# Patient Record
Sex: FEMALE | Race: OTHER | HISPANIC OR LATINO | URBAN - METROPOLITAN AREA
[De-identification: names, ages, dates, MRNs, and addresses within clinical notes are randomized per-mention and may not be internally consistent; named-entity substitution may affect disease eponyms.]

---

## 2023-05-03 ENCOUNTER — EMERGENCY (EMERGENCY)
Facility: HOSPITAL | Age: 33
LOS: 0 days | Discharge: HOME | End: 2023-05-03
Attending: EMERGENCY MEDICINE
Payer: MEDICAID

## 2023-05-03 VITALS
TEMPERATURE: 98 F | OXYGEN SATURATION: 100 % | HEART RATE: 78 BPM | SYSTOLIC BLOOD PRESSURE: 110 MMHG | RESPIRATION RATE: 16 BRPM | DIASTOLIC BLOOD PRESSURE: 58 MMHG

## 2023-05-03 VITALS
HEIGHT: 63 IN | RESPIRATION RATE: 16 BRPM | WEIGHT: 123.46 LBS | SYSTOLIC BLOOD PRESSURE: 142 MMHG | OXYGEN SATURATION: 98 % | DIASTOLIC BLOOD PRESSURE: 92 MMHG | HEART RATE: 60 BPM | TEMPERATURE: 98 F

## 2023-05-03 DIAGNOSIS — R11.0 NAUSEA: ICD-10-CM

## 2023-05-03 DIAGNOSIS — Z3A.01 LESS THAN 8 WEEKS GESTATION OF PREGNANCY: ICD-10-CM

## 2023-05-03 DIAGNOSIS — R10.30 LOWER ABDOMINAL PAIN, UNSPECIFIED: ICD-10-CM

## 2023-05-03 DIAGNOSIS — Z97.5 PRESENCE OF (INTRAUTERINE) CONTRACEPTIVE DEVICE: ICD-10-CM

## 2023-05-03 DIAGNOSIS — O26.891 OTHER SPECIFIED PREGNANCY RELATED CONDITIONS, FIRST TRIMESTER: ICD-10-CM

## 2023-05-03 DIAGNOSIS — O26.31 RETAINED INTRAUTERINE CONTRACEPTIVE DEVICE IN PREGNANCY, FIRST TRIMESTER: ICD-10-CM

## 2023-05-03 LAB
ALBUMIN SERPL ELPH-MCNC: 4.3 G/DL — SIGNIFICANT CHANGE UP (ref 3.5–5.2)
ALP SERPL-CCNC: 69 U/L — SIGNIFICANT CHANGE UP (ref 30–115)
ALT FLD-CCNC: 14 U/L — SIGNIFICANT CHANGE UP (ref 0–41)
ANION GAP SERPL CALC-SCNC: 12 MMOL/L — SIGNIFICANT CHANGE UP (ref 7–14)
APPEARANCE UR: ABNORMAL
AST SERPL-CCNC: 16 U/L — SIGNIFICANT CHANGE UP (ref 0–41)
BACTERIA # UR AUTO: ABNORMAL
BASOPHILS # BLD AUTO: 0.04 K/UL — SIGNIFICANT CHANGE UP (ref 0–0.2)
BASOPHILS NFR BLD AUTO: 0.4 % — SIGNIFICANT CHANGE UP (ref 0–1)
BILIRUB DIRECT SERPL-MCNC: <0.2 MG/DL — SIGNIFICANT CHANGE UP (ref 0–0.3)
BILIRUB INDIRECT FLD-MCNC: SIGNIFICANT CHANGE UP MG/DL (ref 0.2–1.2)
BILIRUB SERPL-MCNC: 0.3 MG/DL — SIGNIFICANT CHANGE UP (ref 0.2–1.2)
BILIRUB UR-MCNC: NEGATIVE — SIGNIFICANT CHANGE UP
BLD GP AB SCN SERPL QL: SIGNIFICANT CHANGE UP
BUN SERPL-MCNC: 15 MG/DL — SIGNIFICANT CHANGE UP (ref 10–20)
CALCIUM SERPL-MCNC: 9.6 MG/DL — SIGNIFICANT CHANGE UP (ref 8.4–10.5)
CHLORIDE SERPL-SCNC: 104 MMOL/L — SIGNIFICANT CHANGE UP (ref 98–110)
CO2 SERPL-SCNC: 19 MMOL/L — SIGNIFICANT CHANGE UP (ref 17–32)
COLOR SPEC: YELLOW — SIGNIFICANT CHANGE UP
CREAT SERPL-MCNC: 0.6 MG/DL — LOW (ref 0.7–1.5)
DIFF PNL FLD: NEGATIVE — SIGNIFICANT CHANGE UP
EGFR: 122 ML/MIN/1.73M2 — SIGNIFICANT CHANGE UP
EOSINOPHIL # BLD AUTO: 0.19 K/UL — SIGNIFICANT CHANGE UP (ref 0–0.7)
EOSINOPHIL NFR BLD AUTO: 2.1 % — SIGNIFICANT CHANGE UP (ref 0–8)
EPI CELLS # UR: 21 /HPF — HIGH (ref 0–5)
GLUCOSE SERPL-MCNC: 84 MG/DL — SIGNIFICANT CHANGE UP (ref 70–99)
GLUCOSE UR QL: NEGATIVE — SIGNIFICANT CHANGE UP
HCG SERPL-ACNC: HIGH MIU/ML
HCT VFR BLD CALC: 37.9 % — SIGNIFICANT CHANGE UP (ref 37–47)
HGB BLD-MCNC: 12.9 G/DL — SIGNIFICANT CHANGE UP (ref 12–16)
HYALINE CASTS # UR AUTO: 1 /LPF — SIGNIFICANT CHANGE UP (ref 0–7)
IMM GRANULOCYTES NFR BLD AUTO: 0.3 % — SIGNIFICANT CHANGE UP (ref 0.1–0.3)
KETONES UR-MCNC: NEGATIVE — SIGNIFICANT CHANGE UP
LEUKOCYTE ESTERASE UR-ACNC: NEGATIVE — SIGNIFICANT CHANGE UP
LIDOCAIN IGE QN: 33 U/L — SIGNIFICANT CHANGE UP (ref 7–60)
LYMPHOCYTES # BLD AUTO: 2.46 K/UL — SIGNIFICANT CHANGE UP (ref 1.2–3.4)
LYMPHOCYTES # BLD AUTO: 27.5 % — SIGNIFICANT CHANGE UP (ref 20.5–51.1)
MCHC RBC-ENTMCNC: 29.5 PG — SIGNIFICANT CHANGE UP (ref 27–31)
MCHC RBC-ENTMCNC: 34 G/DL — SIGNIFICANT CHANGE UP (ref 32–37)
MCV RBC AUTO: 86.7 FL — SIGNIFICANT CHANGE UP (ref 81–99)
MONOCYTES # BLD AUTO: 0.64 K/UL — HIGH (ref 0.1–0.6)
MONOCYTES NFR BLD AUTO: 7.2 % — SIGNIFICANT CHANGE UP (ref 1.7–9.3)
NEUTROPHILS # BLD AUTO: 5.57 K/UL — SIGNIFICANT CHANGE UP (ref 1.4–6.5)
NEUTROPHILS NFR BLD AUTO: 62.5 % — SIGNIFICANT CHANGE UP (ref 42.2–75.2)
NITRITE UR-MCNC: NEGATIVE — SIGNIFICANT CHANGE UP
NRBC # BLD: 0 /100 WBCS — SIGNIFICANT CHANGE UP (ref 0–0)
PH UR: 7 — SIGNIFICANT CHANGE UP (ref 5–8)
PLATELET # BLD AUTO: 273 K/UL — SIGNIFICANT CHANGE UP (ref 130–400)
PMV BLD: 10.9 FL — HIGH (ref 7.4–10.4)
POTASSIUM SERPL-MCNC: 4.5 MMOL/L — SIGNIFICANT CHANGE UP (ref 3.5–5)
POTASSIUM SERPL-SCNC: 4.5 MMOL/L — SIGNIFICANT CHANGE UP (ref 3.5–5)
PROT SERPL-MCNC: 7.1 G/DL — SIGNIFICANT CHANGE UP (ref 6–8)
PROT UR-MCNC: ABNORMAL
RBC # BLD: 4.37 M/UL — SIGNIFICANT CHANGE UP (ref 4.2–5.4)
RBC # FLD: 12.4 % — SIGNIFICANT CHANGE UP (ref 11.5–14.5)
RBC CASTS # UR COMP ASSIST: 2 /HPF — SIGNIFICANT CHANGE UP (ref 0–4)
SODIUM SERPL-SCNC: 135 MMOL/L — SIGNIFICANT CHANGE UP (ref 135–146)
SP GR SPEC: 1.03 — SIGNIFICANT CHANGE UP (ref 1.01–1.03)
UROBILINOGEN FLD QL: SIGNIFICANT CHANGE UP
WBC # BLD: 8.93 K/UL — SIGNIFICANT CHANGE UP (ref 4.8–10.8)
WBC # FLD AUTO: 8.93 K/UL — SIGNIFICANT CHANGE UP (ref 4.8–10.8)
WBC UR QL: 5 /HPF — SIGNIFICANT CHANGE UP (ref 0–5)

## 2023-05-03 PROCEDURE — 76856 US EXAM PELVIC COMPLETE: CPT | Mod: 26

## 2023-05-03 PROCEDURE — 76856 US EXAM PELVIC COMPLETE: CPT

## 2023-05-03 PROCEDURE — 80048 BASIC METABOLIC PNL TOTAL CA: CPT

## 2023-05-03 PROCEDURE — 84702 CHORIONIC GONADOTROPIN TEST: CPT

## 2023-05-03 PROCEDURE — 99282 EMERGENCY DEPT VISIT SF MDM: CPT

## 2023-05-03 PROCEDURE — 99284 EMERGENCY DEPT VISIT MOD MDM: CPT | Mod: 25

## 2023-05-03 PROCEDURE — 81001 URINALYSIS AUTO W/SCOPE: CPT

## 2023-05-03 PROCEDURE — 96374 THER/PROPH/DIAG INJ IV PUSH: CPT

## 2023-05-03 PROCEDURE — 83690 ASSAY OF LIPASE: CPT

## 2023-05-03 PROCEDURE — 86901 BLOOD TYPING SEROLOGIC RH(D): CPT

## 2023-05-03 PROCEDURE — 86900 BLOOD TYPING SEROLOGIC ABO: CPT

## 2023-05-03 PROCEDURE — 36415 COLL VENOUS BLD VENIPUNCTURE: CPT

## 2023-05-03 PROCEDURE — 85025 COMPLETE CBC W/AUTO DIFF WBC: CPT

## 2023-05-03 PROCEDURE — 86850 RBC ANTIBODY SCREEN: CPT

## 2023-05-03 PROCEDURE — 80076 HEPATIC FUNCTION PANEL: CPT

## 2023-05-03 PROCEDURE — 99285 EMERGENCY DEPT VISIT HI MDM: CPT

## 2023-05-03 RX ORDER — SODIUM CHLORIDE 9 MG/ML
1000 INJECTION INTRAMUSCULAR; INTRAVENOUS; SUBCUTANEOUS ONCE
Refills: 0 | Status: COMPLETED | OUTPATIENT
Start: 2023-05-03 | End: 2023-05-03

## 2023-05-03 RX ORDER — ONDANSETRON 8 MG/1
4 TABLET, FILM COATED ORAL ONCE
Refills: 0 | Status: COMPLETED | OUTPATIENT
Start: 2023-05-03 | End: 2023-05-03

## 2023-05-03 RX ADMIN — ONDANSETRON 4 MILLIGRAM(S): 8 TABLET, FILM COATED ORAL at 12:34

## 2023-05-03 RX ADMIN — SODIUM CHLORIDE 1000 MILLILITER(S): 9 INJECTION INTRAMUSCULAR; INTRAVENOUS; SUBCUTANEOUS at 12:34

## 2023-05-03 NOTE — ED PROVIDER NOTE - CLINICAL SUMMARY MEDICAL DECISION MAKING FREE TEXT BOX
34 yo  @6w5d, Rh pos, with IUP and Paragard IUD in situ, no IUD strings visualized, clinically and hemodynamically stable.  OBGyn consulted.  IUD unable to be removed.  No acute GYN intervention required.  Pt to f/u for pregnancy care with [facility], please give patient clinic information with discharge. Pt should follow up in 1-2 weeks.  Strict return instructions discussed.

## 2023-05-03 NOTE — ED PROVIDER NOTE - NS ED ROS FT
Constitutional: (-) fever  Eyes/ENT: (-) blurry vision, (-) epistaxis  Cardiovascular: (-) chest pain, (-) syncope  Respiratory: (-) cough, (-) shortness of breath  Gastrointestinal: (-) vomiting, (-) diarrhea, (+)abd pain  Musculoskeletal: (-) neck pain, (-) back pain, (-) joint pain  Integumentary: (-) rash, (-) edema  Neurological: (-) headache, (-) altered mental status

## 2023-05-03 NOTE — ED PROVIDER NOTE - PATIENT PORTAL LINK FT
Intact
You can access the FollowMyHealth Patient Portal offered by [facility] by registering at the following website: [email or web address]. By joining [employer]’s FollowMyHealth portal, you will also be able to view your health information using other applications (apps) compatible with our system.

## 2023-05-03 NOTE — ED ADULT TRIAGE NOTE - TEMPERATURE IN FAHRENHEIT (DEGREES F)
98 Xelrosyz Pregnancy And Lactation Text: This medication is Pregnancy Category D and is not considered safe during pregnancy.  The risk during breast feeding is also uncertain.

## 2023-05-03 NOTE — ED PROVIDER NOTE - CARE PLAN
1 Principal Discharge DX:	Abdominal pain in pregnancy  Secondary Diagnosis:	Pregnancy occurring while using intrauterine contraceptive device (IUD)

## 2023-05-03 NOTE — ED PROVIDER NOTE - OBJECTIVE STATEMENT
33-year-old female no past medical history, last menstrual period ,  2 para 1 presents to the ED complaining of lower abdominal pain with nausea and home pregnancy test which was +2 days ago.  Patient has IUD that was placed 4 years ago.  No vaginal bleeding, vomiting, fever, chills or urinary symptoms.

## 2023-05-03 NOTE — CONSULT NOTE ADULT - ASSESSMENT
A/P: 34 yo  @6w5d, Rh pos, with IUP and Paragard IUD in situ, no IUD strings visualized, clinically and hemodynamically stable  -IUD unable to be removed  -no acute GYN intervention required  -Pt to f/u for pregnancy care with [facility], please give patient clinic information with discharge. Pt should follow up in 1-2 weeks.  -Encourage small meals, elvis, B6 for nausea in pregnancy  -bleeding precautions  -dispo per ED    Dr. Roblero and Dr. Wilson aware

## 2023-05-03 NOTE — CONSULT NOTE ADULT - SUBJECTIVE AND OBJECTIVE BOX
PGY1 Note  Chief Complaint: abdominal cramping    HPI: 32 yo  @6w5d by sonogram today, LMP 23, with Paragard IUD in situ, presenting to ED for abdominal cramping and positive home pregnancy test. Pt reports lower abdominal cramping for a few days with mild nausea. Denies fever, chills, vomiting, severe abdominal pain. This is an unplanned but desired pregnancy. Pt has Paragard IUD in place for 4 years. No current GYN provider, previously received care in home country [facility].     Denies the following: constitutional symptoms, visual symptoms, cardiovascular symptoms, respiratory symptoms, GI symptoms, musculoskeletal symptoms, skin symptoms, neurologic symptoms, hematologic symptoms, allergic symptoms, psychiatric symptoms  Except any pertinent positives listed.     Ob/Gyn History:                   LMP - 23     LTCS x1 in [facility] for "I never had contractions.", TOP x1, medical  Reports remote h/o of chlamydia, treated       Denies history of ovarian cysts, uterine fibroids, abnormal paps    Allergies  No Known Allergies    PAST MEDICAL & SURGICAL HISTORY:   section    FAMILY HISTORY: no pertinent family history    SOCIAL HISTORY: Denies cigarette use, alcohol use, or illicit drug use    Vital Signs Last 24 Hrs  T(F): 98.1 (03 May 2023 15:53), Max: 98.1 (03 May 2023 15:53)  HR: 78 (03 May 2023 15:53) (60 - 78)  BP: 110/58 (03 May 2023 15:53) (110/58 - 142/92)  RR: 16 (03 May 2023 15:53) (16 - 16)  Height (cm): 160 (23 @ 11:05)  Weight (kg): 56 (23 @ 11:05)  BMI (kg/m2): 21.9 (23 @ 11:05)  BSA (m2): 1.58 (23 @ 11:05)      General Appearance - AAOx3, NAD  Abdomen - Soft, nontender, nondistended, no rebound, no rigidity, no guarding, bowel sounds present    GYN/Pelvis:  External exam: normal female  Internal: normal vagina and cervix. Cervix closed, no bleeding, no CMT, IUD strings not visualized.   Uterus: 6-8 weeks, non-tender  Adnexa: non palpable, non-tender.     Meds:   ondansetron Injectable 4 milliGRAM(s) IV Push once  sodium chloride 0.9% Bolus 1000 milliLiter(s) IV Bolus once    Height (cm): 160 (23 @ 11:05)  Weight (kg): 56 (23 @ 11:05)  BMI (kg/m2): 21.9 (23 @ 11:05)  BSA (m2): 1.58 (23 @ 11:05)    LABS:                        12.9   8.93  )-----------( 273      ( 03 May 2023 12:20 )             37.9     HCG Quantitative, Serum: 70854.0 mIU/mL (23 @ 12:58)    ABO RH Interpretation: A POS (23 @ 12:20)  Antibody Screen: NEG (23 @ 12:20)        135  |  104  |  15  ----------------------------<  84  4.5   |  19  |  0.6<L>    Ca    9.6      03 May 2023 12:20    TPro  7.1  /  Alb  4.3  /  TBili  0.3  /  DBili  <0.2  /  AST  16  /  ALT  14  /  AlkPhos  69        Urinalysis Basic - ( 03 May 2023 12:20 )    Color: Yellow / Appearance: Slightly Turbid / S.030 / pH: x  Gluc: x / Ketone: Negative  / Bili: Negative / Urobili: <2 mg/dL   Blood: x / Protein: 30 mg/dL / Nitrite: Negative   Leuk Esterase: Negative / RBC: 2 /HPF / WBC 5 /HPF   Sq Epi: x / Non Sq Epi: x / Bacteria: Moderate      RADIOLOGY & ADDITIONAL STUDIES  from: US Pelvis Complete (23 @ 13:54)    INTERPRETATION:  CLINICAL INFORMATION: Abdominal pain, IUD    LMP: 2023    COMPARISON: None available.    TECHNIQUE:  Transabdominal pelvic sonogram only.    FINDINGS:  Uterus: 11.1 x 5.1 x 6.6 cm.. Although an IUD is present, there is a   gestational sac with fetal pole, crown-rump length measures 0.8 cm   corresponding to gestational age of 6 weeks 5 days with fetal cardiac   activity at 161 bpm      Right ovary: 3.6 x 2.2 x 2.0 cm. There is a right ovarian cyst measuring   2.4 cm. Vascular flow is identified to the right ovary.  Left ovary: 2.6 x 1.2 x 2.2 cm. Within normal limits. Vascular flow is   identified to the left ovary.    Fluid: None.    IMPRESSION:    Live intrauterine pregnancy with a crown-rump length measuring 0.8 cm   corresponding to gestational age of 6 weeks 5 days with fetal cardiac   activity at 161 bpm    Additional IUD is present    PROCEDURE  R/B/A to removing IUD discussed with patient. Patient elected to proceed with IUD removal. Consent signed with . Pt placed in dorsal lithotomy in stirrups and speculum placed in vagina. Cervix well visualized. IUD strings not seen. Procedure terminated.

## 2023-05-03 NOTE — ED PROVIDER NOTE - NSFOLLOWUPINSTRUCTIONS_ED_ALL_ED_FT
Our Emergency Department Referral Coordinators will be reaching out to you in the next 24-48 hours from 9:00am to 5:00pm with a follow up appointment. Please expect a phone call from the hospital in that time frame. If you do not receive a call or if you have any questions or concerns, you can reach them at   [phone]    Abdominal Pain During Pregnancy    Abdominal pain is common in pregnancy. Most of the time, it does not cause harm. There are many causes of abdominal pain. Some causes are more serious than others. Some of the causes of abdominal pain in pregnancy are easily diagnosed. Occasionally, the diagnosis takes time to understand. Other times, the cause is not determined. Abdominal pain can be a sign that something is very wrong with the pregnancy, or the pain may have nothing to do with the pregnancy at all. For this reason, always tell your health care provider if you have any abdominal discomfort.     HOME CARE INSTRUCTIONS  Monitor your abdominal pain for any changes. The following actions may help to alleviate any discomfort you are experiencing:    Do not have sexual intercourse or put anything in your vagina until your symptoms go away completely.  Get plenty of rest until your pain improves.   Drink clear fluids if you feel nauseous. Avoid solid food as long as you are uncomfortable or nauseous.  Only take over-the-counter or prescription medicine as directed by your health care provider.  Keep all follow-up appointments with your health care provider.    SEEK IMMEDIATE MEDICAL CARE IF:  You are bleeding, leaking fluid, or passing tissue from the vagina.  You have increasing pain or cramping.  You have persistent vomiting.  You have painful or bloody urination.  You have a fever.  You notice a decrease in your baby's movements.  You have extreme weakness or feel faint.  You have shortness of breath, with or without abdominal pain.  You develop a severe headache with abdominal pain.  You have abnormal vaginal discharge with abdominal pain.  You have persistent diarrhea.  You have abdominal pain that continues even after rest, or gets worse.    MAKE SURE YOU:  Understand these instructions.   Will watch your condition.  Will get help right away if you are not doing well or get worse.    ADDITIONAL NOTES AND INSTRUCTIONS    Please follow up with your Primary MD in 24-48 hr.  Seek immediate medical care for any new/worsening signs or symptoms.

## 2023-05-03 NOTE — ED PROVIDER NOTE - NSFOLLOWUPCLINICS_GEN_ALL_ED_FT
[facility]  OB/GYN  [address] [place] [address]  Phone: [phone]  Fax:

## 2023-05-03 NOTE — ED PROVIDER NOTE - NS ED ATTENDING STATEMENT MOD
This was a shared visit with the TOBI. I reviewed and verified the documentation and independently performed the documented:

## 2023-05-05 PROBLEM — Z78.9 OTHER SPECIFIED HEALTH STATUS: Chronic | Status: ACTIVE | Noted: 2023-05-03

## 2023-05-07 PROBLEM — Z00.00 ENCOUNTER FOR PREVENTIVE HEALTH EXAMINATION: Status: ACTIVE | Noted: 2023-05-07

## 2023-05-08 ENCOUNTER — APPOINTMENT (OUTPATIENT)
Dept: OBGYN | Facility: CLINIC | Age: 33
End: 2023-05-08
Payer: COMMERCIAL

## 2023-05-08 ENCOUNTER — OUTPATIENT (OUTPATIENT)
Dept: OUTPATIENT SERVICES | Facility: HOSPITAL | Age: 33
LOS: 1 days | End: 2023-05-08
Payer: COMMERCIAL

## 2023-05-08 VITALS
DIASTOLIC BLOOD PRESSURE: 62 MMHG | BODY MASS INDEX: 25.57 KG/M2 | WEIGHT: 135.44 LBS | HEIGHT: 61 IN | SYSTOLIC BLOOD PRESSURE: 108 MMHG

## 2023-05-08 DIAGNOSIS — Z30.432 ENCOUNTER FOR REMOVAL OF INTRAUTERINE CONTRACEPTIVE DEVICE: ICD-10-CM

## 2023-05-08 PROCEDURE — 76815 OB US LIMITED FETUS(S): CPT | Mod: 26

## 2023-05-08 PROCEDURE — 99205 OFFICE O/P NEW HI 60 MIN: CPT

## 2023-05-08 PROCEDURE — T1013: CPT

## 2023-05-08 PROCEDURE — 87661 TRICHOMONAS VAGINALIS AMPLIF: CPT

## 2023-05-08 PROCEDURE — 88142 CYTOPATH C/V THIN LAYER: CPT

## 2023-05-08 PROCEDURE — 87491 CHLMYD TRACH DNA AMP PROBE: CPT

## 2023-05-08 PROCEDURE — 87798 DETECT AGENT NOS DNA AMP: CPT

## 2023-05-08 PROCEDURE — 76815 OB US LIMITED FETUS(S): CPT

## 2023-05-08 PROCEDURE — 87801 DETECT AGNT MULT DNA AMPLI: CPT

## 2023-05-08 PROCEDURE — 87591 N.GONORRHOEAE DNA AMP PROB: CPT

## 2023-05-08 PROCEDURE — 87624 HPV HI-RISK TYP POOLED RSLT: CPT

## 2023-05-08 NOTE — PLAN
[FreeTextEntry1] : 33 p1 previous c/s\par Pregnancy with IUD.\par Mild bleeding.\par +FH on sono. The IUD was visualized and is not near the gestational sac.\par Patient would like to keep the pregnancy. \par Patient was counselled that she is at increased risk of miscarriage/bleeding, infection/chorio,  birth, abruption. She understands. \par f/u 4 weeks as new OB\par Repeat sono in 2 weeks.\par Prenatal labs ordered\par start prenatal vitamins.\par

## 2023-05-10 DIAGNOSIS — Z34.90 ENCOUNTER FOR SUPERVISION OF NORMAL PREGNANCY, UNSPECIFIED, UNSPECIFIED TRIMESTER: ICD-10-CM

## 2023-05-10 DIAGNOSIS — O20.0 THREATENED ABORTION: ICD-10-CM

## 2023-05-10 DIAGNOSIS — O26.31 RETAINED INTRAUTERINE CONTRACEPTIVE DEVICE IN PREGNANCY, FIRST TRIMESTER: ICD-10-CM

## 2023-05-14 LAB — HPV HIGH+LOW RISK DNA PNL CVX: NOT DETECTED

## 2023-05-19 ENCOUNTER — OUTPATIENT (OUTPATIENT)
Dept: OUTPATIENT SERVICES | Facility: HOSPITAL | Age: 33
LOS: 1 days | End: 2023-05-19
Payer: COMMERCIAL

## 2023-05-19 ENCOUNTER — APPOINTMENT (OUTPATIENT)
Dept: ANTEPARTUM | Facility: CLINIC | Age: 33
End: 2023-05-19
Payer: MEDICAID

## 2023-05-19 ENCOUNTER — ASOB RESULT (OUTPATIENT)
Age: 33
End: 2023-05-19

## 2023-05-19 DIAGNOSIS — Z34.90 ENCOUNTER FOR SUPERVISION OF NORMAL PREGNANCY, UNSPECIFIED, UNSPECIFIED TRIMESTER: ICD-10-CM

## 2023-05-19 LAB
A VAGINAE DNA VAG QL NAA+PROBE: NORMAL
ABO + RH PNL BLD: NORMAL
BLD GP AB SCN SERPL QL: NORMAL
BVAB2 DNA VAG QL NAA+PROBE: NORMAL
C KRUSEI DNA VAG QL NAA+PROBE: NEGATIVE
C TRACH RRNA SPEC QL NAA+PROBE: NEGATIVE
CANDIDA DNA VAG QL NAA+PROBE: NEGATIVE
CYTOLOGY CVX/VAG DOC THIN PREP: ABNORMAL
ESTIMATED AVERAGE GLUCOSE: 114 MG/DL
HBA1C MFR BLD HPLC: 5.6 %
MEGA1 DNA VAG QL NAA+PROBE: NORMAL
N GONORRHOEA RRNA SPEC QL NAA+PROBE: NEGATIVE
T VAGINALIS RRNA SPEC QL NAA+PROBE: NEGATIVE

## 2023-05-19 PROCEDURE — 83655 ASSAY OF LEAD: CPT

## 2023-05-19 PROCEDURE — G0452: CPT | Mod: 26

## 2023-05-19 PROCEDURE — 87389 HIV-1 AG W/HIV-1&-2 AB AG IA: CPT

## 2023-05-19 PROCEDURE — 86762 RUBELLA ANTIBODY: CPT

## 2023-05-19 PROCEDURE — 86787 VARICELLA-ZOSTER ANTIBODY: CPT

## 2023-05-19 PROCEDURE — 87086 URINE CULTURE/COLONY COUNT: CPT

## 2023-05-19 PROCEDURE — 83020 HEMOGLOBIN ELECTROPHORESIS: CPT

## 2023-05-19 PROCEDURE — 76801 OB US < 14 WKS SINGLE FETUS: CPT | Mod: 26

## 2023-05-19 PROCEDURE — 86900 BLOOD TYPING SEROLOGIC ABO: CPT

## 2023-05-19 PROCEDURE — 83020 HEMOGLOBIN ELECTROPHORESIS: CPT | Mod: 26

## 2023-05-19 PROCEDURE — 86480 TB TEST CELL IMMUN MEASURE: CPT

## 2023-05-19 PROCEDURE — 76801 OB US < 14 WKS SINGLE FETUS: CPT

## 2023-05-19 PROCEDURE — 81204 AR GENE CHARAC ALLELES: CPT

## 2023-05-19 PROCEDURE — 86850 RBC ANTIBODY SCREEN: CPT

## 2023-05-19 PROCEDURE — 87340 HEPATITIS B SURFACE AG IA: CPT

## 2023-05-19 PROCEDURE — 85027 COMPLETE CBC AUTOMATED: CPT

## 2023-05-19 PROCEDURE — 86803 HEPATITIS C AB TEST: CPT

## 2023-05-19 PROCEDURE — 83036 HEMOGLOBIN GLYCOSYLATED A1C: CPT

## 2023-05-19 PROCEDURE — 86780 TREPONEMA PALLIDUM: CPT

## 2023-05-19 PROCEDURE — 86901 BLOOD TYPING SEROLOGIC RH(D): CPT

## 2023-05-19 PROCEDURE — 81220 CFTR GENE COM VARIANTS: CPT

## 2023-05-21 LAB
BACTERIA UR CULT: NORMAL
HBV SURFACE AG SER QL: NONREACTIVE
HCV AB SER QL: NONREACTIVE
HCV S/CO RATIO: 0.09 S/CO
HIV1+2 AB SPEC QL IA.RAPID: NONREACTIVE
LEAD BLD-MCNC: <1 UG/DL
RUBV IGG FLD-ACNC: 3 INDEX
RUBV IGG SER-IMP: POSITIVE
T PALLIDUM AB SER QL IA: NEGATIVE
VZV AB TITR SER: POSITIVE
VZV IGG SER IF-ACNC: 205.7 INDEX

## 2023-05-22 LAB
HGB A MFR BLD: 97.6 %
HGB A2 MFR BLD: 2.4 %
HGB FRACT BLD-IMP: NORMAL

## 2023-05-24 DIAGNOSIS — O36.80X0 PREGNANCY WITH INCONCLUSIVE FETAL VIABILITY, NOT APPLICABLE OR UNSPECIFIED: ICD-10-CM

## 2023-05-24 DIAGNOSIS — O26.31 RETAINED INTRAUTERINE CONTRACEPTIVE DEVICE IN PREGNANCY, FIRST TRIMESTER: ICD-10-CM

## 2023-05-24 DIAGNOSIS — T83.31XA BREAKDOWN (MECHANICAL) OF INTRAUTERINE CONTRACEPTIVE DEVICE, INITIAL ENCOUNTER: ICD-10-CM

## 2023-05-24 DIAGNOSIS — Z3A.09 9 WEEKS GESTATION OF PREGNANCY: ICD-10-CM

## 2023-05-30 ENCOUNTER — EMERGENCY (EMERGENCY)
Facility: HOSPITAL | Age: 33
LOS: 0 days | Discharge: HOME | End: 2023-05-30
Attending: EMERGENCY MEDICINE
Payer: MEDICAID

## 2023-05-30 VITALS
RESPIRATION RATE: 16 BRPM | HEIGHT: 63 IN | WEIGHT: 130.07 LBS | SYSTOLIC BLOOD PRESSURE: 120 MMHG | HEART RATE: 93 BPM | OXYGEN SATURATION: 97 % | DIASTOLIC BLOOD PRESSURE: 71 MMHG | TEMPERATURE: 98 F

## 2023-05-30 DIAGNOSIS — R11.0 NAUSEA: ICD-10-CM

## 2023-05-30 DIAGNOSIS — R10.30 LOWER ABDOMINAL PAIN, UNSPECIFIED: ICD-10-CM

## 2023-05-30 DIAGNOSIS — Z3A.10 10 WEEKS GESTATION OF PREGNANCY: ICD-10-CM

## 2023-05-30 DIAGNOSIS — O20.9 HEMORRHAGE IN EARLY PREGNANCY, UNSPECIFIED: ICD-10-CM

## 2023-05-30 LAB
ALBUMIN SERPL ELPH-MCNC: 3.9 G/DL — SIGNIFICANT CHANGE UP (ref 3.5–5.2)
ALP SERPL-CCNC: 85 U/L — SIGNIFICANT CHANGE UP (ref 30–115)
ALT FLD-CCNC: 13 U/L — SIGNIFICANT CHANGE UP (ref 0–41)
ANION GAP SERPL CALC-SCNC: 11 MMOL/L — SIGNIFICANT CHANGE UP (ref 7–14)
AR GENE MUT ANL BLD/T: NORMAL
AST SERPL-CCNC: 15 U/L — SIGNIFICANT CHANGE UP (ref 0–41)
BASOPHILS # BLD AUTO: 0.03 K/UL — SIGNIFICANT CHANGE UP (ref 0–0.2)
BASOPHILS NFR BLD AUTO: 0.3 % — SIGNIFICANT CHANGE UP (ref 0–1)
BILIRUB SERPL-MCNC: 0.3 MG/DL — SIGNIFICANT CHANGE UP (ref 0.2–1.2)
BUN SERPL-MCNC: 11 MG/DL — SIGNIFICANT CHANGE UP (ref 10–20)
CALCIUM SERPL-MCNC: 9.5 MG/DL — SIGNIFICANT CHANGE UP (ref 8.4–10.5)
CFTR MUT TESTED BLD/T: NEGATIVE
CHLORIDE SERPL-SCNC: 102 MMOL/L — SIGNIFICANT CHANGE UP (ref 98–110)
CO2 SERPL-SCNC: 22 MMOL/L — SIGNIFICANT CHANGE UP (ref 17–32)
CREAT SERPL-MCNC: 0.6 MG/DL — LOW (ref 0.7–1.5)
EGFR: 122 ML/MIN/1.73M2 — SIGNIFICANT CHANGE UP
EOSINOPHIL # BLD AUTO: 0.23 K/UL — SIGNIFICANT CHANGE UP (ref 0–0.7)
EOSINOPHIL NFR BLD AUTO: 2 % — SIGNIFICANT CHANGE UP (ref 0–8)
FMR1 GENE MUT ANL BLD/T: NORMAL
GLUCOSE SERPL-MCNC: 92 MG/DL — SIGNIFICANT CHANGE UP (ref 70–99)
HCG SERPL-ACNC: HIGH MIU/ML
HCT VFR BLD CALC: 32.1 % — LOW (ref 37–47)
HGB BLD-MCNC: 11.4 G/DL — LOW (ref 12–16)
IMM GRANULOCYTES NFR BLD AUTO: 0.8 % — HIGH (ref 0.1–0.3)
LYMPHOCYTES # BLD AUTO: 1.86 K/UL — SIGNIFICANT CHANGE UP (ref 1.2–3.4)
LYMPHOCYTES # BLD AUTO: 16.5 % — LOW (ref 20.5–51.1)
M TB IFN-G BLD-IMP: NEGATIVE
MCHC RBC-ENTMCNC: 30.2 PG — SIGNIFICANT CHANGE UP (ref 27–31)
MCHC RBC-ENTMCNC: 35.5 G/DL — SIGNIFICANT CHANGE UP (ref 32–37)
MCV RBC AUTO: 85.1 FL — SIGNIFICANT CHANGE UP (ref 81–99)
MONOCYTES # BLD AUTO: 0.55 K/UL — SIGNIFICANT CHANGE UP (ref 0.1–0.6)
MONOCYTES NFR BLD AUTO: 4.9 % — SIGNIFICANT CHANGE UP (ref 1.7–9.3)
NEUTROPHILS # BLD AUTO: 8.49 K/UL — HIGH (ref 1.4–6.5)
NEUTROPHILS NFR BLD AUTO: 75.5 % — HIGH (ref 42.2–75.2)
NRBC # BLD: 0 /100 WBCS — SIGNIFICANT CHANGE UP (ref 0–0)
PLATELET # BLD AUTO: 290 K/UL — SIGNIFICANT CHANGE UP (ref 130–400)
PMV BLD: 10.2 FL — SIGNIFICANT CHANGE UP (ref 7.4–10.4)
POTASSIUM SERPL-MCNC: 4.3 MMOL/L — SIGNIFICANT CHANGE UP (ref 3.5–5)
POTASSIUM SERPL-SCNC: 4.3 MMOL/L — SIGNIFICANT CHANGE UP (ref 3.5–5)
PROT SERPL-MCNC: 7 G/DL — SIGNIFICANT CHANGE UP (ref 6–8)
QUANTIFERON TB PLUS MITOGEN MINUS NIL: 9.13 IU/ML
QUANTIFERON TB PLUS NIL: 0.03 IU/ML
QUANTIFERON TB PLUS TB1 MINUS NIL: -0.01 IU/ML
QUANTIFERON TB PLUS TB2 MINUS NIL: -0.01 IU/ML
RBC # BLD: 3.77 M/UL — LOW (ref 4.2–5.4)
RBC # FLD: 12.9 % — SIGNIFICANT CHANGE UP (ref 11.5–14.5)
SODIUM SERPL-SCNC: 135 MMOL/L — SIGNIFICANT CHANGE UP (ref 135–146)
WBC # BLD: 11.25 K/UL — HIGH (ref 4.8–10.8)
WBC # FLD AUTO: 11.25 K/UL — HIGH (ref 4.8–10.8)

## 2023-05-30 PROCEDURE — 99284 EMERGENCY DEPT VISIT MOD MDM: CPT | Mod: 25

## 2023-05-30 PROCEDURE — 84702 CHORIONIC GONADOTROPIN TEST: CPT

## 2023-05-30 PROCEDURE — 99282 EMERGENCY DEPT VISIT SF MDM: CPT

## 2023-05-30 PROCEDURE — 76815 OB US LIMITED FETUS(S): CPT | Mod: 26

## 2023-05-30 PROCEDURE — 99285 EMERGENCY DEPT VISIT HI MDM: CPT

## 2023-05-30 PROCEDURE — 80053 COMPREHEN METABOLIC PANEL: CPT

## 2023-05-30 PROCEDURE — 85025 COMPLETE CBC W/AUTO DIFF WBC: CPT

## 2023-05-30 PROCEDURE — 36415 COLL VENOUS BLD VENIPUNCTURE: CPT

## 2023-05-30 PROCEDURE — 76815 OB US LIMITED FETUS(S): CPT

## 2023-05-30 RX ORDER — SODIUM CHLORIDE 9 MG/ML
1000 INJECTION INTRAMUSCULAR; INTRAVENOUS; SUBCUTANEOUS ONCE
Refills: 0 | Status: COMPLETED | OUTPATIENT
Start: 2023-05-30 | End: 2023-05-30

## 2023-05-30 RX ADMIN — SODIUM CHLORIDE 1000 MILLILITER(S): 9 INJECTION INTRAMUSCULAR; INTRAVENOUS; SUBCUTANEOUS at 10:40

## 2023-05-30 NOTE — ED PROVIDER NOTE - CLINICAL SUMMARY MEDICAL DECISION MAKING FREE TEXT BOX
Vaginal bleeding.  Known IUP.  GYN consulted.  Labs and imaging ordered and reviewed by me. Vaginal bleeding.  Known IUP.  GYN consulted.  Labs and imaging ordered and reviewed by me.    Seen by GYN.  Evaluated in the ED.  Bleeding precautions.  Repeat sono outpatient.  And follow-up in 2 weeks.

## 2023-05-30 NOTE — ED PROVIDER NOTE - NSFOLLOWUPINSTRUCTIONS_ED_ALL_ED_FT
Seguimiento según lo programado con pimentel ginecólogo en 2 semanas.    Regrese al departamento de emergencias si hay sangrado significativo, dolor intenso o cualquier otra inquietud, o dificultad para respirar.    Se recomiendan precauciones de sangrado.    [place] Tylenol para el dolor hipolito se indica en el paquete

## 2023-05-30 NOTE — ED PROVIDER NOTE - PHYSICAL EXAMINATION
VITAL SIGNS: I have reviewed nursing notes and confirm.  CONSTITUTIONAL: non-toxic, well appearing  SKIN: no rash, no petechiae.  EYES: Pink conjunctiva, anicteric  ENT: tongue midline, MMM  NECK: Supple,  CARD: RRR, positive systolic murmurs, equal radial pulses bilaterally 2+  RESP: CTAB, no respiratory distress  ABD: Soft, non-tender, non-distended, no peritoneal signs, no HSM, no CVA tenderness  EXT: Normal ROM x4. No edema. No calves tenderness  NEURO: Alert, oriented. CN2-12 intact, equal strength bilaterally, nl gait.  PSYCH: Cooperative, appropriate.

## 2023-05-30 NOTE — ED ADULT NURSE NOTE - OBJECTIVE STATEMENT
Pt presents to ED c/o abdominal cramping and vaginal bleeding that started light but has gotten worse.

## 2023-05-30 NOTE — CONSULT NOTE ADULT - ASSESSMENT
34 yo  @ 10w4d by U/S, vaginal bleeding, IUP and IUD in place, no acute gyn intervention, clinically and hemodynamically stable    -Bleeding precautions given  -Tylenol PRN for Headache  -Repeat sono outpatient  -F/u with Dr. Elizabeth at next prenatal visit in 2 weeks  -Dispo per ED    Dr. Thompson and Dr. Latha agustin

## 2023-05-30 NOTE — ED PROVIDER NOTE - PATIENT PORTAL LINK FT
You can access the FollowMyHealth Patient Portal offered by [facility] by registering at the following website: [email or web address]. By joining [employer]’s FollowMyHealth portal, you will also be able to view your health information using other applications (apps) compatible with our system.

## 2023-05-30 NOTE — ED PROVIDER NOTE - OBJECTIVE STATEMENT
33-year-old female without significant past medical history, IUD placed 4 years ago in [place], now approximately 12 weeks pregnant LMP February 21, 2023, followed by GYN clinic Dr. Elizabeth last visit 2 weeks ago, had an ultrasound at that time that showed + IUP, attempt was performed to remove the IUD But unsuccessful and had a little bit of bleeding.  Now patient comes in with continued bleeding getting worse with lower abdominal pain.  Positive nausea but no vomiting.  No fever no diarrhea.  Patient had time today to come to be evaluated so decided to come to emergency department.

## 2023-05-30 NOTE — ED PROVIDER NOTE - PROGRESS NOTE DETAILS
Seen by GYN.  Evaluated in the ED.  Bleeding precautions.  Repeat sono outpatient.  And follow-up in 2 weeks.

## 2023-05-30 NOTE — ED PROVIDER NOTE - NSFOLLOWUPCLINICS_GEN_ALL_ED_FT
[facility]  OB/GYN  [address] [place] [address]  Phone: [phone]  Fax:   Follow Up Time: Routine

## 2023-05-30 NOTE — CONSULT NOTE ADULT - SUBJECTIVE AND OBJECTIVE BOX
Chief Complaint: vaginal bleeding   [id number]  HPI: 32 yo  @ 10w4d by 1st trimester cristhian, presents to the ED for increased vaginal bleeding. She states that she has had vaginal spotting for about 3 weeks, in the last few days it has increased to about 3 pads a day, half saturated with rice grain sized clots. She has an IUD in place with an IUP, 2 weeks ago her PMD tried to retrieve it unsuccessfully. She endorses nausea and intermittent abdominal cramping the comes and goes. She has had a mild headache, that comes and goes, has not taken Tylenol. She denies any dizziness, lightheadedness, vomiting, fever, chills, severe abdominal pain, chest pain, sob.     Ob/Gyn History:   (1x c/s 1x SAB)        LMP -  23                Cycle Length -   Endorses h/o remote chlamydia treated, Denies history of ovarian cysts, uterine fibroids, abnormal paps,    Denies the following: constitutional symptoms, visual symptoms, cardiovascular symptoms, respiratory symptoms, GI symptoms, musculoskeletal symptoms, skin symptoms, neurologic symptoms, hematologic symptoms, allergic symptoms, psychiatric symptoms  Except any pertinent positives listed.   Home Medications:  Allergies  No Known Allergies  Intolerances    PAST MEDICAL & SURGICAL HISTORY:   section  No pertinent past medical history    FAMILY HISTORY:  SOCIAL HISTORY: Denies cigarette use, alcohol use, or illicit drug use    Vital Signs Last 24 Hrs  T(F): 98.4 (30 May 2023 09:03), Max: 98.4 (30 May 2023 09:03)  HR: 93 (30 May 2023 09:03) (93 - 93)  BP: 120/71 (30 May 2023 09:03) (120/71 - 120/71)  RR: 16 (30 May 2023 09:03) (16 - 16)  Height (cm): 160 (23 @ 09:03)  Weight (kg): 59 (23 @ 09:03)  BMI (kg/m2): 23 (23 @ 09:03)  BSA (m2): 1.61 (23 @ 09:03)    General Appearance - AAOx3, NAD  Abdomen - Soft, mildly tender to palpation, nondistended, no rebound, no rigidity, no guarding    GYN/Pelvis: Yari Jennings RN  External female genitalia - normal  Vagina - 10cc dark red blood in the vagina,   Cervix - no active extravasation noted    Uterus:  Size - 10weeks  Tenderness - None  Mass - None  Freely mobile    Adnexa:  Masses - None  Tenderness - None      Meds:   sodium chloride 0.9% Bolus 1000 milliLiter(s) IV Bolus once    Height (cm): 160 (23 @ 09:03)  Weight (kg): 59 (23 @ 09:03)  BMI (kg/m2): 23 (23 @ 09:03)  BSA (m2): 1.61 (23 @ 09:03)    LABS:                        11.4   11.25 )-----------( 290      ( 30 May 2023 10:40 )             32.1     HCG Quantitative, Serum: 30183.0 mIU/mL (23 @ 10:40)          135  |  102  |  11  ----------------------------<  92  4.3   |  22  |  0.6<L>    Ca    9.5      30 May 2023 10:40    TPro  7.0  /  Alb  3.9  /  TBili  0.3  /  DBili  x   /  AST  15  /  ALT  13  /  AlkPhos  85              RADIOLOGY & ADDITIONAL STUDIES:  < from: US OB Fetus Limited (23 @ 10:13) >    ACC: [id number] EXAM:  US OB LTD FETUS(ES)   ORDERED BY: MARGE FORTE     PROCEDURE DATE:  2023          INTERPRETATION:  CLINICAL INFORMATION: Vaginal bleeding. Pregnant.    LMP: 2023    Estimated Gestational Age by LMP: 13 weeks 5days    COMPARISON: May 3, 2023    Transabdominal pelvic sonogram only.    FINDINGS:  Uterus: 10.7 x 7.8 x 7.4 cm    Femur length was a little greater than 7 mm corresponding to 12 weeks 2   days gestation.  Gestational Sac Shape : Normal.  Fetal Heart Rate: 180 bpm.    Right ovary: 3.0 cm x 2.3 cm x 3.2 cm. Corpus luteal cyst is seen.  Left ovary: 2.9 cm x 1.5 cm x 2.3 cm. Within normal limits.    Fluid: None.    IMPRESSION:  Single live intrauterine gestation.  Estimated gestational age of 13 weeks  Estimated due date of  December 10, 2023.        --- End of Report ---            JOHN PARISI MD; Attending Interventional Radiologist  This document has been electronically signed. May 30 2023 10:23AM    < end of copied text >  < from: US Pelvis Complete (23 @ 13:54) >    ACC: [id number] EXAM:  US PELVIC COMPLETE   ORDERED BY: JOHNY WRIGHT     PROCEDURE DATE:  2023          INTERPRETATION:  CLINICAL INFORMATION: Abdominal pain, IUD    LMP: 2023    COMPARISON: None available.    TECHNIQUE:  Transabdominal pelvic sonogram only.    FINDINGS:  Uterus: 11.1 x 5.1 x 6.6 cm.. Although an IUD is present, there is a   gestational sac with fetal pole, crown-rump length measures 0.8 cm   corresponding to gestational age of 6 weeks 5 days with fetal cardiac   activity at 161 bpm      Right ovary: 3.6 x 2.2 x 2.0 cm. There is a right ovarian cyst measuring   2.4 cm. Vascular flow is identified to the right ovary.  Left ovary: 2.6 x 1.2 x 2.2 cm. Within normal limits. Vascular flow is   identified to the left ovary.    Fluid: None.    IMPRESSION:    Live intrauterine pregnancy with a crown-rump length measuring 0.8 cm   corresponding to gestational age of 6 weeks 5 days with fetal cardiac   activity at 161 bpm    Additional IUD is present        --- End of Report ---            DAVID SCHMITT MD; Attending Radiologist  This document has been electronically signed. May  3 2023  2:08PM    < end of copied text >

## 2023-06-01 ENCOUNTER — TRANSCRIPTION ENCOUNTER (OUTPATIENT)
Age: 33
End: 2023-06-01

## 2023-06-01 ENCOUNTER — INPATIENT (INPATIENT)
Facility: HOSPITAL | Age: 33
LOS: 0 days | Discharge: HOME | DRG: 560 | End: 2023-06-01
Attending: OBSTETRICS & GYNECOLOGY | Admitting: OBSTETRICS & GYNECOLOGY
Payer: MEDICAID

## 2023-06-01 VITALS
RESPIRATION RATE: 15 BRPM | HEART RATE: 93 BPM | SYSTOLIC BLOOD PRESSURE: 113 MMHG | DIASTOLIC BLOOD PRESSURE: 55 MMHG | OXYGEN SATURATION: 98 %

## 2023-06-01 VITALS
HEART RATE: 107 BPM | HEIGHT: 63 IN | DIASTOLIC BLOOD PRESSURE: 74 MMHG | OXYGEN SATURATION: 96 % | RESPIRATION RATE: 22 BRPM | SYSTOLIC BLOOD PRESSURE: 175 MMHG | TEMPERATURE: 98 F | WEIGHT: 130.07 LBS

## 2023-06-01 DIAGNOSIS — N93.8 OTHER SPECIFIED ABNORMAL UTERINE AND VAGINAL BLEEDING: ICD-10-CM

## 2023-06-01 DIAGNOSIS — Z98.891 HISTORY OF UTERINE SCAR FROM PREVIOUS SURGERY: Chronic | ICD-10-CM

## 2023-06-01 LAB
ALBUMIN SERPL ELPH-MCNC: 3.8 G/DL — SIGNIFICANT CHANGE UP (ref 3.5–5.2)
ALP SERPL-CCNC: 104 U/L — SIGNIFICANT CHANGE UP (ref 30–115)
ALT FLD-CCNC: 23 U/L — SIGNIFICANT CHANGE UP (ref 0–41)
ANION GAP SERPL CALC-SCNC: 16 MMOL/L — HIGH (ref 7–14)
ANISOCYTOSIS BLD QL: SLIGHT — SIGNIFICANT CHANGE UP
AST SERPL-CCNC: 29 U/L — SIGNIFICANT CHANGE UP (ref 0–41)
BASOPHILS # BLD AUTO: 0 K/UL — SIGNIFICANT CHANGE UP (ref 0–0.2)
BASOPHILS # BLD AUTO: 0.03 K/UL — SIGNIFICANT CHANGE UP (ref 0–0.2)
BASOPHILS NFR BLD AUTO: 0 % — SIGNIFICANT CHANGE UP (ref 0–1)
BASOPHILS NFR BLD AUTO: 0.2 % — SIGNIFICANT CHANGE UP (ref 0–1)
BILIRUB SERPL-MCNC: 0.7 MG/DL — SIGNIFICANT CHANGE UP (ref 0.2–1.2)
BLD GP AB SCN SERPL QL: SIGNIFICANT CHANGE UP
BUN SERPL-MCNC: 8 MG/DL — LOW (ref 10–20)
CALCIUM SERPL-MCNC: 9.6 MG/DL — SIGNIFICANT CHANGE UP (ref 8.4–10.5)
CHLORIDE SERPL-SCNC: 97 MMOL/L — LOW (ref 98–110)
CO2 SERPL-SCNC: 19 MMOL/L — SIGNIFICANT CHANGE UP (ref 17–32)
CREAT SERPL-MCNC: 0.8 MG/DL — SIGNIFICANT CHANGE UP (ref 0.7–1.5)
EGFR: 100 ML/MIN/1.73M2 — SIGNIFICANT CHANGE UP
EOSINOPHIL # BLD AUTO: 0 K/UL — SIGNIFICANT CHANGE UP (ref 0–0.7)
EOSINOPHIL # BLD AUTO: 0 K/UL — SIGNIFICANT CHANGE UP (ref 0–0.7)
EOSINOPHIL NFR BLD AUTO: 0 % — SIGNIFICANT CHANGE UP (ref 0–8)
EOSINOPHIL NFR BLD AUTO: 0 % — SIGNIFICANT CHANGE UP (ref 0–8)
GIANT PLATELETS BLD QL SMEAR: PRESENT — SIGNIFICANT CHANGE UP
GLUCOSE SERPL-MCNC: 125 MG/DL — HIGH (ref 70–99)
HCG SERPL-ACNC: HIGH MIU/ML
HCT VFR BLD CALC: 32.4 % — LOW (ref 37–47)
HCT VFR BLD CALC: 38.1 % — SIGNIFICANT CHANGE UP (ref 37–47)
HGB BLD-MCNC: 11.4 G/DL — LOW (ref 12–16)
HGB BLD-MCNC: 13.4 G/DL — SIGNIFICANT CHANGE UP (ref 12–16)
IMM GRANULOCYTES NFR BLD AUTO: 1.3 % — HIGH (ref 0.1–0.3)
LYMPHOCYTES # BLD AUTO: 0.34 K/UL — LOW (ref 1.2–3.4)
LYMPHOCYTES # BLD AUTO: 0.44 K/UL — LOW (ref 1.2–3.4)
LYMPHOCYTES # BLD AUTO: 2 % — LOW (ref 20.5–51.1)
LYMPHOCYTES # BLD AUTO: 3.5 % — LOW (ref 20.5–51.1)
MANUAL SMEAR VERIFICATION: SIGNIFICANT CHANGE UP
MCHC RBC-ENTMCNC: 29.6 PG — SIGNIFICANT CHANGE UP (ref 27–31)
MCHC RBC-ENTMCNC: 29.6 PG — SIGNIFICANT CHANGE UP (ref 27–31)
MCHC RBC-ENTMCNC: 35.2 G/DL — SIGNIFICANT CHANGE UP (ref 32–37)
MCHC RBC-ENTMCNC: 35.2 G/DL — SIGNIFICANT CHANGE UP (ref 32–37)
MCV RBC AUTO: 84.2 FL — SIGNIFICANT CHANGE UP (ref 81–99)
MCV RBC AUTO: 84.3 FL — SIGNIFICANT CHANGE UP (ref 81–99)
MICROCYTES BLD QL: SLIGHT — SIGNIFICANT CHANGE UP
MONOCYTES # BLD AUTO: 0.22 K/UL — SIGNIFICANT CHANGE UP (ref 0.1–0.6)
MONOCYTES # BLD AUTO: 0.41 K/UL — SIGNIFICANT CHANGE UP (ref 0.1–0.6)
MONOCYTES NFR BLD AUTO: 1.7 % — SIGNIFICANT CHANGE UP (ref 1.7–9.3)
MONOCYTES NFR BLD AUTO: 2.4 % — SIGNIFICANT CHANGE UP (ref 1.7–9.3)
NEUTROPHILS # BLD AUTO: 11.9 K/UL — HIGH (ref 1.4–6.5)
NEUTROPHILS # BLD AUTO: 16.1 K/UL — HIGH (ref 1.4–6.5)
NEUTROPHILS NFR BLD AUTO: 80.9 % — HIGH (ref 42.2–75.2)
NEUTROPHILS NFR BLD AUTO: 94.1 % — HIGH (ref 42.2–75.2)
NEUTS BAND # BLD: 13 % — HIGH (ref 0–6)
NRBC # BLD: 0 /100 WBCS — SIGNIFICANT CHANGE UP (ref 0–0)
PLAT MORPH BLD: NORMAL — SIGNIFICANT CHANGE UP
PLATELET # BLD AUTO: 201 K/UL — SIGNIFICANT CHANGE UP (ref 130–400)
PLATELET # BLD AUTO: 243 K/UL — SIGNIFICANT CHANGE UP (ref 130–400)
PMV BLD: 11 FL — HIGH (ref 7.4–10.4)
PMV BLD: 11 FL — HIGH (ref 7.4–10.4)
POLYCHROMASIA BLD QL SMEAR: SLIGHT — SIGNIFICANT CHANGE UP
POTASSIUM SERPL-MCNC: 3.7 MMOL/L — SIGNIFICANT CHANGE UP (ref 3.5–5)
POTASSIUM SERPL-SCNC: 3.7 MMOL/L — SIGNIFICANT CHANGE UP (ref 3.5–5)
PROT SERPL-MCNC: 6.7 G/DL — SIGNIFICANT CHANGE UP (ref 6–8)
RBC # BLD: 3.85 M/UL — LOW (ref 4.2–5.4)
RBC # BLD: 4.52 M/UL — SIGNIFICANT CHANGE UP (ref 4.2–5.4)
RBC # FLD: 13 % — SIGNIFICANT CHANGE UP (ref 11.5–14.5)
RBC # FLD: 13.2 % — SIGNIFICANT CHANGE UP (ref 11.5–14.5)
RBC BLD AUTO: ABNORMAL
SODIUM SERPL-SCNC: 132 MMOL/L — LOW (ref 135–146)
VARIANT LYMPHS # BLD: 0.9 % — SIGNIFICANT CHANGE UP (ref 0–5)
WBC # BLD: 12.67 K/UL — HIGH (ref 4.8–10.8)
WBC # BLD: 17.11 K/UL — HIGH (ref 4.8–10.8)
WBC # FLD AUTO: 12.67 K/UL — HIGH (ref 4.8–10.8)
WBC # FLD AUTO: 17.11 K/UL — HIGH (ref 4.8–10.8)

## 2023-06-01 PROCEDURE — 88304 TISSUE EXAM BY PATHOLOGIST: CPT

## 2023-06-01 PROCEDURE — 88304 TISSUE EXAM BY PATHOLOGIST: CPT | Mod: 26

## 2023-06-01 PROCEDURE — 36430 TRANSFUSION BLD/BLD COMPNT: CPT

## 2023-06-01 PROCEDURE — P9016: CPT

## 2023-06-01 PROCEDURE — 88300 SURGICAL PATH GROSS: CPT | Mod: 26

## 2023-06-01 PROCEDURE — 85025 COMPLETE CBC W/AUTO DIFF WBC: CPT

## 2023-06-01 PROCEDURE — P9040: CPT

## 2023-06-01 PROCEDURE — 36415 COLL VENOUS BLD VENIPUNCTURE: CPT

## 2023-06-01 PROCEDURE — 99285 EMERGENCY DEPT VISIT HI MDM: CPT

## 2023-06-01 PROCEDURE — 88300 SURGICAL PATH GROSS: CPT

## 2023-06-01 RX ORDER — HYDROMORPHONE HYDROCHLORIDE 2 MG/ML
0.5 INJECTION INTRAMUSCULAR; INTRAVENOUS; SUBCUTANEOUS
Refills: 0 | Status: DISCONTINUED | OUTPATIENT
Start: 2023-06-01 | End: 2023-06-01

## 2023-06-01 RX ORDER — ONDANSETRON 8 MG/1
4 TABLET, FILM COATED ORAL ONCE
Refills: 0 | Status: DISCONTINUED | OUTPATIENT
Start: 2023-06-01 | End: 2023-06-01

## 2023-06-01 RX ORDER — FENTANYL CITRATE 50 UG/ML
50 INJECTION INTRAVENOUS ONCE
Refills: 0 | Status: DISCONTINUED | OUTPATIENT
Start: 2023-06-01 | End: 2023-06-01

## 2023-06-01 RX ORDER — SODIUM CHLORIDE 9 MG/ML
1000 INJECTION, SOLUTION INTRAVENOUS ONCE
Refills: 0 | Status: COMPLETED | OUTPATIENT
Start: 2023-06-01 | End: 2023-06-01

## 2023-06-01 RX ORDER — KETOROLAC TROMETHAMINE 30 MG/ML
15 SYRINGE (ML) INJECTION ONCE
Refills: 0 | Status: COMPLETED | OUTPATIENT
Start: 2023-06-01 | End: 2023-06-01

## 2023-06-01 RX ORDER — SODIUM CHLORIDE 9 MG/ML
1000 INJECTION, SOLUTION INTRAVENOUS
Refills: 0 | Status: DISCONTINUED | OUTPATIENT
Start: 2023-06-01 | End: 2023-06-01

## 2023-06-01 RX ORDER — ACETAMINOPHEN 500 MG
1000 TABLET ORAL ONCE
Refills: 0 | Status: COMPLETED | OUTPATIENT
Start: 2023-06-01 | End: 2023-06-01

## 2023-06-01 RX ADMIN — HYDROMORPHONE HYDROCHLORIDE 0.5 MILLIGRAM(S): 2 INJECTION INTRAMUSCULAR; INTRAVENOUS; SUBCUTANEOUS at 16:15

## 2023-06-01 RX ADMIN — Medication 400 MILLIGRAM(S): at 13:58

## 2023-06-01 RX ADMIN — HYDROMORPHONE HYDROCHLORIDE 0.5 MILLIGRAM(S): 2 INJECTION INTRAMUSCULAR; INTRAVENOUS; SUBCUTANEOUS at 16:00

## 2023-06-01 RX ADMIN — SODIUM CHLORIDE 1000 MILLILITER(S): 9 INJECTION, SOLUTION INTRAVENOUS at 12:09

## 2023-06-01 RX ADMIN — FENTANYL CITRATE 50 MICROGRAM(S): 50 INJECTION INTRAVENOUS at 13:59

## 2023-06-01 NOTE — ED PROVIDER NOTE - OBJECTIVE STATEMENT
33-year-old female G2, P1 10 weeks pregnant presents with vaginal bleeding.  Patient was here 2 days ago for vaginal spotting.  At that time was by OB found to have an IUP with an IUP D still in place.  Over the last 24 hours patient was having significantly more vaginal bleeding, soaking through 3 pads since the morning, and visualizing clots.  Admits to constant contraction.  Denies chest pain shortness of breath dizziness lightheadedness.  Accompanied by friend who is assisting to translate (Dominican Speaking)

## 2023-06-01 NOTE — ASU DISCHARGE PLAN (ADULT/PEDIATRIC) - ASU DC SPECIAL INSTRUCTIONSFT
Please schedule an appointment to see your physician in two weeks. Please  your prescription for doxycyline 100mg and take twice a day for 1 day.

## 2023-06-01 NOTE — ED PROVIDER NOTE - PROGRESS NOTE DETAILS
SS Bedside pelvic exam completed with significant pooling in the vaginal vault.  OB immediately consulted.  OB evaluated at bedside attempted to remove clots but still bleeding.  Plan to take for D&C.  Patient aware and agreeable to plan

## 2023-06-01 NOTE — ASU DISCHARGE PLAN (ADULT/PEDIATRIC) - NS MD DC FALL RISK RISK
For information on Fall & Injury Prevention, visit: [email or web address] OR  [email or web address] OR  https://www.cdc.gov/steadi/patient.html

## 2023-06-01 NOTE — BRIEF OPERATIVE NOTE - NSICDXBRIEFPROCEDURE_GEN_ALL_CORE_FT
PROCEDURES:  D&C, uterus, therapeutic, for incomplete, missed, septic, or induced  2023 15:39:56  Laly Azul  Removal of IUD 2023 15:40:05  Laly Azul

## 2023-06-01 NOTE — BRIEF OPERATIVE NOTE - OPERATION/FINDINGS
Normal appearing external genitalia. BSS showed products of conception in lower uterine segment, fetus with no FHR. Normal appearing cervix. Moderate products of conception evacuated under ultrasound guidance. Paragard IUD removed intact under ultrasound guidance.

## 2023-06-01 NOTE — ASU DISCHARGE PLAN (ADULT/PEDIATRIC) - CARE PROVIDER_API CALL
Ant Elizabeth  Obstetrics and Gynecology  [address] [place] [id number]  Phone: [phone]  Fax: [phone]  Established Patient  Follow Up Time: 2 weeks

## 2023-06-01 NOTE — H&P ADULT - HISTORY OF PRESENT ILLNESS
34 yo  @ 10w6d by 1st trimester cristhian, presents to the ED for increased vaginal bleeding. Was seen in the ED on  for vaginal bleeding, discharged in stable condition. Starting again this morning, she started to pass large clotsand associated with 10/10 intermittent cramping. Associated with lightheadedness.  She states that she has had vaginal spotting for about 3 weeks, but has been increasing over the last few days. She has an IUD in place with an IUP, 2 weeks ago her PMD tried to retrieve it unsuccessfully. She denies any vomiting, fever, chills, chest pain, sob.     Ob/Gyn History:   (1x c/s 1x SAB)        LMP -  23                Cycle Length -   Endorses h/o remote chlamydia treated, Denies history of ovarian cysts, uterine fibroids, abnormal paps,    Denies the following: constitutional symptoms, visual symptoms, cardiovascular symptoms, respiratory symptoms, GI symptoms, musculoskeletal symptoms, skin symptoms, neurologic symptoms, hematologic symptoms, allergic symptoms, psychiatric symptoms  Except any pertinent positives listed.   [id number]  32 yo  @ 10w6d by 1st trimester cristhian, presents to the ED for increased vaginal bleeding. Was seen in the ED on  for vaginal bleeding, discharged in stable condition. Starting again this morning, she started to pass large clotsand associated with 10/10 intermittent cramping. Associated with lightheadedness.  She states that she has had vaginal spotting for about 3 weeks, but has been increasing over the last few days. She has an IUD in place with an IUP, 2 weeks ago her PMD tried to retrieve it unsuccessfully. She denies any vomiting, fever, chills, chest pain, sob.     Ob/Gyn History:   (1x c/s, 1x SAB)        LMP -  23                 Endorses h/o remote chlamydia treated, Denies history of ovarian cysts, uterine fibroids, abnormal paps,

## 2023-06-01 NOTE — H&P ADULT - NSHPREVIEWOFSYSTEMS_GEN_ALL_CORE
No Denies the following: constitutional symptoms, visual symptoms, cardiovascular symptoms, respiratory symptoms, GI symptoms, musculoskeletal symptoms, skin symptoms, neurologic symptoms, hematologic symptoms, allergic symptoms, psychiatric symptoms  Except any pertinent positives listed.

## 2023-06-01 NOTE — ED PROVIDER NOTE - CLINICAL SUMMARY MEDICAL DECISION MAKING FREE TEXT BOX
23-year-old female presenting today with vaginal bleeding.  Patient is hemodynamically stable upon evaluation.  Patient has significant pooling in her vaginal vault.  OB was immediately consulted and evaluated patient at bedside.  Patient was taken to the operating room for further evaluation and care.

## 2023-06-01 NOTE — ED PROVIDER NOTE - PHYSICAL EXAMINATION
CONSTITUTIONAL: Acutely distressed, uncomfortable and in pain   SKIN: Warm dry  HEAD: NCAT  EYES: NL inspection  ENT: MMM  NECK: Supple; non tender.  ABD: Soft no R/G  EXT: no pedal edema  : chaperone Dr Mann  Vaginal vault with blood pooling

## 2023-06-01 NOTE — H&P ADULT - NSHPLABSRESULTS_GEN_ALL_CORE
11.4   12.67 )-----------( 243      ( 06-01 @ 12:12 )             32.4 11.4   12.67 )-----------( 243      ( 06-01 @ 12:12 )             32.4    Antibody Screen: NEG (06.01.23 @ 12:25)   ABO RH Interpretation: A POS (06.01.23 @ 12:25)

## 2023-06-01 NOTE — H&P ADULT - ASSESSMENT
32 yo  @ 10w6d, with incomplete  and active vaginal bleeding, IUD in place, added on for suction D&C.     - admit to GYN Dr Gandhi  - cross 2u PRBCs  - 200mg IV doxycycline  - remove IUD at time of D&C  - on call to the OR    Dr Gandhi aware 34 yo  @ 10w6d, with incomplete  and active vaginal bleeding, IUD in place, added on for suction D&C.     - admit to GYN Dr Gandhi  - cross 2u PRBCs  - 200mg IV doxycycline  - remove IUD at time of D&C  - NPO, IVF  - monitor vitals  - on call to the OR    Dr Gandhi aware

## 2023-06-01 NOTE — H&P ADULT - NSHPPHYSICALEXAM_GEN_ALL_CORE
Vital Signs Last 24 Hrs  T(C): 36.5 (01 Jun 2023 11:19), Max: 36.5 (01 Jun 2023 11:19)  T(F): 97.7 (01 Jun 2023 11:19), Max: 97.7 (01 Jun 2023 11:19)  HR: 107 (01 Jun 2023 11:19) (107 - 107)  BP: 175/74 (01 Jun 2023 11:19) (175/74 - 175/74)  RR: 22 (01 Jun 2023 11:19) (22 - 22)  SpO2: 96% (01 Jun 2023 11:19) (96% - 96%)    Parameters below as of 01 Jun 2023 11:19  Patient On (Oxygen Delivery Method): room air    General Appearance - AAOx3, with strong on/off abdominal cramping pain  Abdomen - Soft, nontender, nondistended, no rebound, no rigidity, no guarding, bowel sounds present    BSS: IUD in place by fundus, GS/CRL noted in the lower uterine segment, absent FHR    GYN/Pelvis:  External - normal labia majora and minora  Internal - active pooling of bright red blood and clots, EBL 300cc, cervix dilated 1-2cm

## 2023-06-01 NOTE — ED ADULT TRIAGE NOTE - CHIEF COMPLAINT QUOTE
pt came to ED for vaginal bleeding at home, states she is going through 2 pads an hour, c/o abdominal cramping.

## 2023-06-05 ENCOUNTER — APPOINTMENT (OUTPATIENT)
Dept: OBGYN | Facility: CLINIC | Age: 33
End: 2023-06-05

## 2023-06-06 DIAGNOSIS — O03.4 INCOMPLETE SPONTANEOUS ABORTION WITHOUT COMPLICATION: ICD-10-CM

## 2023-06-09 ENCOUNTER — APPOINTMENT (OUTPATIENT)
Dept: ANTEPARTUM | Facility: CLINIC | Age: 33
End: 2023-06-09

## 2023-06-26 ENCOUNTER — OUTPATIENT (OUTPATIENT)
Dept: OUTPATIENT SERVICES | Facility: HOSPITAL | Age: 33
LOS: 1 days | End: 2023-06-26
Payer: COMMERCIAL

## 2023-06-26 ENCOUNTER — APPOINTMENT (OUTPATIENT)
Dept: OBGYN | Facility: CLINIC | Age: 33
End: 2023-06-26
Payer: COMMERCIAL

## 2023-06-26 VITALS
SYSTOLIC BLOOD PRESSURE: 105 MMHG | BODY MASS INDEX: 25.02 KG/M2 | WEIGHT: 132.5 LBS | DIASTOLIC BLOOD PRESSURE: 56 MMHG | HEIGHT: 61 IN

## 2023-06-26 DIAGNOSIS — Z87.59 PERSONAL HISTORY OF OTHER COMPLICATIONS OF PREGNANCY, CHILDBIRTH AND THE PUERPERIUM: ICD-10-CM

## 2023-06-26 DIAGNOSIS — Z00.00 ENCOUNTER FOR GENERAL ADULT MEDICAL EXAMINATION WITHOUT ABNORMAL FINDINGS: ICD-10-CM

## 2023-06-26 DIAGNOSIS — Z30.017 ENCOUNTER FOR INITIAL PRESCRIPTION OF IMPLANTABLE SUBDERMAL CONTRACEPTIVE: ICD-10-CM

## 2023-06-26 DIAGNOSIS — O26.31 RETAINED INTRAUTERINE CONTRACEPTIVE DEVICE IN PREGNANCY, FIRST TRIMESTER: ICD-10-CM

## 2023-06-26 DIAGNOSIS — O03.9 COMPLETE OR UNSPECIFIED SPONTANEOUS ABORTION W/OUT COMPLICATION: ICD-10-CM

## 2023-06-26 DIAGNOSIS — Z98.891 HISTORY OF UTERINE SCAR FROM PREVIOUS SURGERY: Chronic | ICD-10-CM

## 2023-06-26 DIAGNOSIS — O03.9 COMPLETE OR UNSPECIFIED SPONTANEOUS ABORTION WITHOUT COMPLICATION: ICD-10-CM

## 2023-06-26 PROCEDURE — 99024 POSTOP FOLLOW-UP VISIT: CPT

## 2023-06-26 PROCEDURE — 11981 INSERTION DRUG DLVR IMPLANT: CPT

## 2023-06-26 PROCEDURE — 99214 OFFICE O/P EST MOD 30 MIN: CPT

## 2023-06-26 PROCEDURE — T1013: CPT

## 2023-06-26 RX ORDER — ETONOGESTREL 68 MG/1
68 IMPLANT SUBCUTANEOUS
Qty: 1 | Refills: 0 | Status: ACTIVE | OUTPATIENT
Start: 2023-06-26

## 2023-06-26 NOTE — PLAN
[FreeTextEntry1] : Patient counselled at length. various birth control options offered. Patient had a nexplanon in the past. \par Nexplanon inserted. She was counselled it was good for 3 years.\par f/u 1 year or prn.\par

## 2023-06-26 NOTE — REASON FOR VISIT
Pt refusing second straight cath. Pt stated \"the cath hurts I need something to help with the cath.\" NP paged for lidocaine to help with the straight cath process. New order placed.    Pt refused lidocaine.   [Follow-Up] : a follow-up evaluation of

## 2023-06-26 NOTE — HISTORY OF PRESENT ILLNESS
[FreeTextEntry1] : Patient here for f/u after miscarriage.\par She was seen [facility] with IUP and an in IUD.\par She presented to ER with bleeding and had a d&C. Final path - chorionic villi. The IUD was also removed at the same time.\par Patient feels well. No bleeding. No pain.\par She does not want to get pregnant.\par

## 2023-07-17 NOTE — ED ADULT TRIAGE NOTE - PRO INTERPRETER NEED 2
Subjective:       Patient ID: Suzi Koch is a 75 y.o. female.    Chief Complaint: Follow-up (2 month)       HPI  35-year-old female newly diagnosed with hypothyroidism, on follow-up after been on thyroid supplement for 2 months.  Patient refers a feels a lot better, more energy, in her mind she is socially which is getting Lasix.    Labs discussed with the patient, in the recent past he had requested to see a cardiologist who has changed her pravastatin to rosuvastatin have no objections to that  Review of Systems    No fever chills or flu-like symptoms   Denies nausea vomiting diarrhea constipation   Denies dysuria urgency frequency or hematuria   No recent falls   No chest pain at rest or exertion no shortness of breaths  Objective:      Physical Exam  alert oriented x3   HEENT within normal limits   Heart regular rhythm  Lungs are clear bilateral no wheezing rales or rhonchi   Abdomen benign   Extremities no edema  Assessment:       1. Hypothyroidism, unspecified type    2. Primary hypertension    3. Elevated cholesterol  - Comprehensive Metabolic Panel; Future  - Lipid Panel; Future      Plan:        Labs  discussed  all stable  now  ,       As per Dr Davin claudio   RTC 6 months       
[place]

## 2023-09-21 DIAGNOSIS — Z30.432 ENCOUNTER FOR REMOVAL OF INTRAUTERINE CONTRACEPTIVE DEVICE: ICD-10-CM

## 2023-09-22 DIAGNOSIS — Z30.432 ENCOUNTER FOR REMOVAL OF INTRAUTERINE CONTRACEPTIVE DEVICE: ICD-10-CM

## 2024-07-15 ENCOUNTER — APPOINTMENT (OUTPATIENT)
Dept: OBGYN | Facility: CLINIC | Age: 34
End: 2024-07-15